# Patient Record
Sex: MALE | Race: WHITE | NOT HISPANIC OR LATINO | Employment: OTHER | URBAN - METROPOLITAN AREA
[De-identification: names, ages, dates, MRNs, and addresses within clinical notes are randomized per-mention and may not be internally consistent; named-entity substitution may affect disease eponyms.]

---

## 2024-04-19 NOTE — PROGRESS NOTES
"Subjective   Patient ID: Jack VELEZ Nejbauer \"Laury" is a 44 y.o. male who presents for hernia.  HPI  New patient to me here for evaluation of suspected umbilical hernia.  Patient had a CT scan in May 2024.  It revealed an enlarged liver and positive gallstones.  On my review of the CT although not mentioned in the report patient has an incarcerated umbilical hernia.  There also may be possible weaknesses in the inguinal sites.  Patient did have an EGD colonoscopy in February 2023 he does have a known history of Canela's and colon polyps.  Patient first noticed something after significant coughing approximately 6 months ago.  He says the bulging is like a football in his upper abdomen.  He denies any symptoms in his groin.  He denies any known family history of hernias.  His weight is slightly increased.    Review of Systems   Constitutional:  Negative for appetite change, fever and unexpected weight change.   HENT:  Negative for congestion and trouble swallowing.    Respiratory:  Negative for cough and shortness of breath.    Cardiovascular:  Negative for chest pain and palpitations.   Gastrointestinal:  Negative for abdominal pain, diarrhea and nausea.   Genitourinary:  Negative for difficulty urinating and dysuria.   Musculoskeletal:  Negative for back pain and gait problem.   Skin:  Negative for color change and rash.   Neurological:  Negative for dizziness, speech difficulty and headaches.   Hematological:  Does not bruise/bleed easily.   Psychiatric/Behavioral:  Negative for confusion.      Past Surgical History:   Procedure Laterality Date    MULTIPLE TOOTH EXTRACTIONS Bilateral 01/2023    all teeth removed-implants placed     Social History:  Tobacco Use - former quit 18 months ago  Do you use any recreational drugs - no  Alcohol Use - daily 4 drinks  Caffeine Intake - yes  Working - yes  Marital status -   Living with - spouse      Objective   Physical Exam  BP (!) 162/111 (BP Location: Left arm, " "Patient Position: Sitting)   Pulse 70   Ht 1.778 m (5' 10\")   Wt 121 kg (266 lb)   SpO2 97%   BMI 38.17 kg/m²    GENERAL APPEARANCE: Patient appears in no acute distress.   EYES: Sclera non-icteric, PERRLA.   ENT Normal appearance of ears and nose.   NECK/THYROID: Neck: no masses. Thyroid: no masses.   LYMPH NODES: No cervical or supraclavicular lymphadenopathy.   CARDIOVASCULAR Heart: RRR, no murmurs; Carotid bruits: none; Peripheral edema: none.   RESPIRATORY: Lungs: clear to auscultation bilaterally; no respiratory distress.   GI (ABDOMEN) No intraabdominal mass appreciated, no hepatosplenomegaly; Hernia: Patient with a large diastases recti in the epigastric area.  Patient also with a small incarcerated umbilical hernia with tenderness upon attempted reduction.  Unable to reduce completely.  No evidence of inguinal hernias on physical exam.  No tenderness in the inguinal region.     PSYCH: Patient oriented to time, place and person, normal affect.    Assessment/Plan   Problem List Items Addressed This Visit             ICD-10-CM    Umbilical hernia with obstruction but no gangrene K42.0     Patient with an incarcerated umbilical hernia.  Tenderness upon attempted reduction.  Palpates to be fatty tissue within the hernia.  Recommend open repair with mesh.  Risk benefits alternatives of doing the procedure were thoroughly discussed with the patient agrees to proceed.  Patient is going to discuss with his family to determine when to schedule.         Diastasis recti - Primary M62.08     Patient's main complaint is concerning the bulging he has in the epigastric area.  There is no hernia here it is a diastases recti.  Patient was reassured no treatment needed for this phenomenon.                 Coty Arroyo MD 04/25/24 9:36 AM   "

## 2024-04-24 ASSESSMENT — ENCOUNTER SYMPTOMS
CONFUSION: 0
FEVER: 0
HEADACHES: 0
NAUSEA: 0
DIZZINESS: 0
PALPITATIONS: 0
UNEXPECTED WEIGHT CHANGE: 0
BRUISES/BLEEDS EASILY: 0
SPEECH DIFFICULTY: 0
APPETITE CHANGE: 0
DIFFICULTY URINATING: 0
ABDOMINAL PAIN: 0
COLOR CHANGE: 0
DYSURIA: 0
COUGH: 0
SHORTNESS OF BREATH: 0
DIARRHEA: 0
TROUBLE SWALLOWING: 0
BACK PAIN: 0

## 2024-04-25 ENCOUNTER — OFFICE VISIT (OUTPATIENT)
Dept: SURGERY | Facility: CLINIC | Age: 44
End: 2024-04-25
Payer: COMMERCIAL

## 2024-04-25 ENCOUNTER — HOSPITAL ENCOUNTER (OUTPATIENT)
Facility: HOSPITAL | Age: 44
Setting detail: OUTPATIENT SURGERY
End: 2024-04-25
Attending: SURGERY | Admitting: SURGERY
Payer: COMMERCIAL

## 2024-04-25 VITALS
HEIGHT: 70 IN | HEART RATE: 70 BPM | SYSTOLIC BLOOD PRESSURE: 162 MMHG | OXYGEN SATURATION: 97 % | BODY MASS INDEX: 38.08 KG/M2 | DIASTOLIC BLOOD PRESSURE: 111 MMHG | WEIGHT: 266 LBS

## 2024-04-25 DIAGNOSIS — K42.0 UMBILICAL HERNIA WITH OBSTRUCTION BUT NO GANGRENE: ICD-10-CM

## 2024-04-25 DIAGNOSIS — M62.08 DIASTASIS RECTI: Primary | ICD-10-CM

## 2024-04-25 PROCEDURE — 99214 OFFICE O/P EST MOD 30 MIN: CPT | Performed by: SURGERY

## 2024-04-25 PROCEDURE — 99204 OFFICE O/P NEW MOD 45 MIN: CPT | Performed by: SURGERY

## 2024-04-25 PROCEDURE — 1036F TOBACCO NON-USER: CPT | Performed by: SURGERY

## 2024-04-25 RX ORDER — OMEPRAZOLE 40 MG/1
40 CAPSULE, DELAYED RELEASE ORAL
COMMUNITY

## 2024-04-25 RX ORDER — OMEPRAZOLE 20 MG/1
40 TABLET, DELAYED RELEASE ORAL
COMMUNITY
End: 2024-04-25 | Stop reason: DRUGHIGH

## 2024-04-25 RX ORDER — VERAPAMIL HYDROCHLORIDE 120 MG/1
120 TABLET, FILM COATED ORAL 2 TIMES DAILY
COMMUNITY

## 2024-04-25 ASSESSMENT — PAIN SCALES - GENERAL: PAINLEVEL: 0-NO PAIN

## 2024-04-25 ASSESSMENT — PATIENT HEALTH QUESTIONNAIRE - PHQ9
2. FEELING DOWN, DEPRESSED OR HOPELESS: NOT AT ALL
1. LITTLE INTEREST OR PLEASURE IN DOING THINGS: NOT AT ALL
SUM OF ALL RESPONSES TO PHQ9 QUESTIONS 1 AND 2: 0

## 2024-04-25 NOTE — ASSESSMENT & PLAN NOTE
Patient with an incarcerated umbilical hernia.  Tenderness upon attempted reduction.  Palpates to be fatty tissue within the hernia.  Recommend open repair with mesh.  Risk benefits alternatives of doing the procedure were thoroughly discussed with the patient agrees to proceed.  Patient is going to discuss with his family to determine when to schedule.

## 2024-04-25 NOTE — ASSESSMENT & PLAN NOTE
Patient's main complaint is concerning the bulging he has in the epigastric area.  There is no hernia here it is a diastases recti.  Patient was reassured no treatment needed for this phenomenon.

## 2024-05-09 ENCOUNTER — TELEPHONE (OUTPATIENT)
Dept: SURGERY | Facility: CLINIC | Age: 44
End: 2024-05-09
Payer: COMMERCIAL

## 2024-05-09 NOTE — TELEPHONE ENCOUNTER
ANABEL FROM [facility] CALLED OFFICE TO VERIFY PA FOR SURGERY ON 5/24/24 PER PT INS[employer]ANCE [employer].  PER NEIL WAITE AT INS[employer] AUTH #[id number] FROM DATES 5/24/24- 7/24/24.    LEFT  FOR ANABEL WAITE. [phone]

## 2024-05-16 ENCOUNTER — APPOINTMENT (OUTPATIENT)
Dept: PREADMISSION TESTING | Facility: HOSPITAL | Age: 44
End: 2024-05-16
Payer: COMMERCIAL

## 2024-06-03 NOTE — PROGRESS NOTES
"Subjective   Patient ID: Jack VELEZ Nejbauer \"Ángel\" is a 44 y.o. male who presents for No chief complaint on file..  HPI    Review of Systems    Objective   Physical Exam    Assessment/Plan   {Assess/PlanSmartLinks:84238}         Coty Arroyo MD 06/03/24 10:18 AM   "

## 2024-06-11 ENCOUNTER — APPOINTMENT (OUTPATIENT)
Dept: SURGERY | Facility: CLINIC | Age: 44
End: 2024-06-11
Payer: COMMERCIAL

## 2024-08-27 ENCOUNTER — APPOINTMENT (OUTPATIENT)
Dept: PRIMARY CARE | Facility: CLINIC | Age: 44
End: 2024-08-27
Payer: COMMERCIAL

## 2024-11-18 ENCOUNTER — OFFICE VISIT (OUTPATIENT)
Dept: GASTROENTEROLOGY | Facility: CLINIC | Age: 44
End: 2024-11-18
Payer: COMMERCIAL

## 2024-11-18 ENCOUNTER — OFFICE VISIT (OUTPATIENT)
Dept: PRIMARY CARE | Facility: CLINIC | Age: 44
End: 2024-11-18
Payer: COMMERCIAL

## 2024-11-18 VITALS
TEMPERATURE: 97.8 F | DIASTOLIC BLOOD PRESSURE: 80 MMHG | HEIGHT: 69 IN | BODY MASS INDEX: 39.25 KG/M2 | HEART RATE: 60 BPM | SYSTOLIC BLOOD PRESSURE: 156 MMHG | WEIGHT: 265 LBS

## 2024-11-18 VITALS
HEART RATE: 87 BPM | RESPIRATION RATE: 20 BRPM | WEIGHT: 265.2 LBS | OXYGEN SATURATION: 99 % | BODY MASS INDEX: 37.97 KG/M2 | SYSTOLIC BLOOD PRESSURE: 128 MMHG | HEIGHT: 70 IN | TEMPERATURE: 97.8 F | DIASTOLIC BLOOD PRESSURE: 82 MMHG

## 2024-11-18 DIAGNOSIS — R13.19 ESOPHAGEAL DYSPHAGIA: ICD-10-CM

## 2024-11-18 DIAGNOSIS — K21.9 GASTROESOPHAGEAL REFLUX DISEASE, UNSPECIFIED WHETHER ESOPHAGITIS PRESENT: ICD-10-CM

## 2024-11-18 DIAGNOSIS — K92.1 HEMATOCHEZIA: Primary | ICD-10-CM

## 2024-11-18 DIAGNOSIS — K22.70 BARRETT'S ESOPHAGUS WITHOUT DYSPLASIA: ICD-10-CM

## 2024-11-18 DIAGNOSIS — K52.9 CHRONIC DIARRHEA: ICD-10-CM

## 2024-11-18 DIAGNOSIS — K59.09 CHRONIC CONSTIPATION: ICD-10-CM

## 2024-11-18 DIAGNOSIS — Z12.5 PROSTATE CANCER SCREENING: ICD-10-CM

## 2024-11-18 DIAGNOSIS — G44.001 INTRACTABLE CLUSTER HEADACHE SYNDROME, UNSPECIFIED CHRONICITY PATTERN: Primary | ICD-10-CM

## 2024-11-18 DIAGNOSIS — F10.90 HEAVY ALCOHOL USE: ICD-10-CM

## 2024-11-18 DIAGNOSIS — Z00.00 ROUTINE GENERAL MEDICAL EXAMINATION AT A HEALTH CARE FACILITY: ICD-10-CM

## 2024-11-18 LAB
ALBUMIN SERPL BCP-MCNC: 4.2 G/DL (ref 3.4–5)
ALP SERPL-CCNC: 57 U/L (ref 33–120)
ALT SERPL W P-5'-P-CCNC: 45 U/L (ref 10–52)
ANION GAP SERPL CALCULATED.3IONS-SCNC: 12 MMOL/L (ref 10–20)
AST SERPL W P-5'-P-CCNC: 30 U/L (ref 9–39)
BASOPHILS # BLD AUTO: 0.07 X10*3/UL (ref 0–0.1)
BASOPHILS NFR BLD AUTO: 1.1 %
BILIRUB SERPL-MCNC: 0.6 MG/DL (ref 0–1.2)
BUN SERPL-MCNC: 17 MG/DL (ref 6–23)
CALCIUM SERPL-MCNC: 9.6 MG/DL (ref 8.6–10.3)
CHLORIDE SERPL-SCNC: 102 MMOL/L (ref 98–107)
CHOLEST SERPL-MCNC: 229 MG/DL (ref 0–199)
CHOLEST/HDLC SERPL: 4.8 {RATIO}
CO2 SERPL-SCNC: 27 MMOL/L (ref 21–32)
CREAT SERPL-MCNC: 0.92 MG/DL (ref 0.5–1.3)
EGFRCR SERPLBLD CKD-EPI 2021: >90 ML/MIN/1.73M*2
EOSINOPHIL # BLD AUTO: 0.35 X10*3/UL (ref 0–0.7)
EOSINOPHIL NFR BLD AUTO: 5.5 %
ERYTHROCYTE [DISTWIDTH] IN BLOOD BY AUTOMATED COUNT: 13.7 % (ref 11.5–14.5)
GLUCOSE SERPL-MCNC: 92 MG/DL (ref 74–99)
HCT VFR BLD AUTO: 45.2 % (ref 41–52)
HDLC SERPL-MCNC: 48.2 MG/DL
HGB BLD-MCNC: 15 G/DL (ref 13.5–17.5)
IMM GRANULOCYTES # BLD AUTO: 0.03 X10*3/UL (ref 0–0.7)
IMM GRANULOCYTES NFR BLD AUTO: 0.5 % (ref 0–0.9)
LDLC SERPL CALC-MCNC: 130 MG/DL
LYMPHOCYTES # BLD AUTO: 2.38 X10*3/UL (ref 1.2–4.8)
LYMPHOCYTES NFR BLD AUTO: 37.6 %
MCH RBC QN AUTO: 31.8 PG (ref 26–34)
MCHC RBC AUTO-ENTMCNC: 33.2 G/DL (ref 32–36)
MCV RBC AUTO: 96 FL (ref 80–100)
MONOCYTES # BLD AUTO: 0.65 X10*3/UL (ref 0.1–1)
MONOCYTES NFR BLD AUTO: 10.3 %
NEUTROPHILS # BLD AUTO: 2.85 X10*3/UL (ref 1.2–7.7)
NEUTROPHILS NFR BLD AUTO: 45 %
NON HDL CHOLESTEROL: 181 MG/DL (ref 0–149)
NRBC BLD-RTO: 0 /100 WBCS (ref 0–0)
PLATELET # BLD AUTO: 298 X10*3/UL (ref 150–450)
POTASSIUM SERPL-SCNC: 4.3 MMOL/L (ref 3.5–5.3)
PROT SERPL-MCNC: 7.3 G/DL (ref 6.4–8.2)
PSA SERPL-MCNC: 0.66 NG/ML
RBC # BLD AUTO: 4.72 X10*6/UL (ref 4.5–5.9)
SODIUM SERPL-SCNC: 137 MMOL/L (ref 136–145)
T4 FREE SERPL-MCNC: 0.72 NG/DL (ref 0.61–1.12)
TRIGL SERPL-MCNC: 254 MG/DL (ref 0–149)
TSH SERPL-ACNC: 4.91 MIU/L (ref 0.44–3.98)
VLDL: 51 MG/DL (ref 0–40)
WBC # BLD AUTO: 6.3 X10*3/UL (ref 4.4–11.3)

## 2024-11-18 PROCEDURE — 3008F BODY MASS INDEX DOCD: CPT | Performed by: NURSE PRACTITIONER

## 2024-11-18 PROCEDURE — 80053 COMPREHEN METABOLIC PANEL: CPT | Performed by: NURSE PRACTITIONER

## 2024-11-18 PROCEDURE — 85025 COMPLETE CBC W/AUTO DIFF WBC: CPT | Performed by: NURSE PRACTITIONER

## 2024-11-18 PROCEDURE — 84443 ASSAY THYROID STIM HORMONE: CPT | Performed by: NURSE PRACTITIONER

## 2024-11-18 PROCEDURE — 93000 ELECTROCARDIOGRAM COMPLETE: CPT | Performed by: NURSE PRACTITIONER

## 2024-11-18 PROCEDURE — 99214 OFFICE O/P EST MOD 30 MIN: CPT | Performed by: NURSE PRACTITIONER

## 2024-11-18 PROCEDURE — 84439 ASSAY OF FREE THYROXINE: CPT | Performed by: NURSE PRACTITIONER

## 2024-11-18 PROCEDURE — 99204 OFFICE O/P NEW MOD 45 MIN: CPT | Performed by: NURSE PRACTITIONER

## 2024-11-18 PROCEDURE — 1036F TOBACCO NON-USER: CPT | Performed by: NURSE PRACTITIONER

## 2024-11-18 PROCEDURE — 99396 PREV VISIT EST AGE 40-64: CPT | Performed by: NURSE PRACTITIONER

## 2024-11-18 PROCEDURE — 84153 ASSAY OF PSA TOTAL: CPT | Performed by: NURSE PRACTITIONER

## 2024-11-18 PROCEDURE — 80061 LIPID PANEL: CPT | Performed by: NURSE PRACTITIONER

## 2024-11-18 RX ORDER — POLYETHYLENE GLYCOL 3350, SODIUM SULFATE ANHYDROUS, SODIUM BICARBONATE, SODIUM CHLORIDE, POTASSIUM CHLORIDE 236; 22.74; 6.74; 5.86; 2.97 G/4L; G/4L; G/4L; G/4L; G/4L
4 POWDER, FOR SOLUTION ORAL ONCE
Qty: 4000 ML | Refills: 0 | Status: SHIPPED | OUTPATIENT
Start: 2024-11-18 | End: 2024-11-18

## 2024-11-18 RX ORDER — VERAPAMIL HCL 240 MG
240 TABLET, EXTENDED RELEASE ORAL NIGHTLY
COMMUNITY
Start: 2024-11-02

## 2024-11-18 RX ORDER — SUMATRIPTAN SUCCINATE 100 MG/1
100 TABLET ORAL ONCE AS NEEDED
COMMUNITY
Start: 2024-10-29 | End: 2024-11-18 | Stop reason: SDUPTHER

## 2024-11-18 RX ORDER — SUMATRIPTAN 6 MG/.5ML
6 INJECTION, SOLUTION SUBCUTANEOUS AS NEEDED
COMMUNITY
Start: 2024-11-05

## 2024-11-18 RX ORDER — GALCANEZUMAB 100 MG/ML
INJECTION, SOLUTION SUBCUTANEOUS
COMMUNITY

## 2024-11-18 RX ORDER — SUMATRIPTAN SUCCINATE 100 MG/1
100 TABLET ORAL ONCE AS NEEDED
Qty: 9 TABLET | Refills: 11 | Status: SHIPPED | OUTPATIENT
Start: 2024-11-18

## 2024-11-18 ASSESSMENT — ENCOUNTER SYMPTOMS
RESPIRATORY NEGATIVE: 1
MYALGIAS: 0
EYE PAIN: 0
ADENOPATHY: 0
HALLUCINATIONS: 0
SHORTNESS OF BREATH: 0
AGITATION: 0
CARDIOVASCULAR NEGATIVE: 1
BACK PAIN: 0
GASTROINTESTINAL NEGATIVE: 1
DIAPHORESIS: 0
NUMBNESS: 0
PALPITATIONS: 0
DEPRESSION: 0
DYSURIA: 0
CONSTITUTIONAL NEGATIVE: 1
FATIGUE: 0
CHILLS: 0
SORE THROAT: 0
FEVER: 0
OCCASIONAL FEELINGS OF UNSTEADINESS: 0
NERVOUS/ANXIOUS: 0
LOSS OF SENSATION IN FEET: 0
WEAKNESS: 0
COUGH: 0
MUSCULOSKELETAL NEGATIVE: 1
FREQUENCY: 0
ARTHRALGIAS: 0
JOINT SWELLING: 0
PHOTOPHOBIA: 0
FLANK PAIN: 0
HEMATURIA: 0
WHEEZING: 0
DIZZINESS: 0
HEADACHES: 1
LIGHT-HEADEDNESS: 0

## 2024-11-18 ASSESSMENT — PATIENT HEALTH QUESTIONNAIRE - PHQ9
SUM OF ALL RESPONSES TO PHQ9 QUESTIONS 1 AND 2: 0
1. LITTLE INTEREST OR PLEASURE IN DOING THINGS: NOT AT ALL
2. FEELING DOWN, DEPRESSED OR HOPELESS: NOT AT ALL

## 2024-11-18 ASSESSMENT — PAIN SCALES - GENERAL: PAINLEVEL_OUTOF10: 0-NO PAIN

## 2024-11-18 NOTE — PATIENT INSTRUCTIONS
Thanks for coming to the GI clinic.     I would like you to get an EGD.     I would like you to get a colonoscopy.   Please read all of the instructions 7 days before your colonoscopy.   You will need to take ONLY clear liquids the ENTIRE day before your procedure. These include (clear fruit juices, soda, Gatorade, broth, jello and coffee/tea) Avoid red and purple drinks. No cream or milk in the coffee.   You will need to take a bowel preparation.   You will also need a .      Please call [phone] to schedule.     Follow up will be based upon the above.

## 2024-11-18 NOTE — PROGRESS NOTES
"Subjective   Patient ID: Jack VELEZ Nejbauer \"Ángel\" is a 44 y.o. male who presents for rectal bleeding.     This is a 44 year old WM with history of nicotine/heavy alcohol use, obesity, SERGEY on CPAP, HTN, DLD, cluster headaches, GERD, and non dysplastic Canela's esophagus who is presenting to the GI clinic for an initial visit.      History per pt, wife, and review of EMR    Of note, pt had a cluster headache in clinic for which we had to delay the visit for approximately 5 minutes.     Reports for the past 5 years he's been having painless rectal bleeding. As of most recent, though, the bleeding is more frequent and is darker in color (wine colored). He's passing clots per rectum. Sometimes he passes blood with bowel movements, but sometimes he passes blood without any stool.     ROS positive for episodic esophageal dysphagia for the past month. Feels food stuck in his throat after swallowing. No issues with chewing or initiating a swallow.     ROS positive for many years' worth of constipation and diarrhea. 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 Sometimes he has diarrhea which looks like \"muddy watery\". Averages 1-2 stools per day. Denies nocturnal diarrhea.     GERD controlled with omeprazole 40 mg/day. Has heartburn if he does not take omeprazole.     Denies unintentional weight loss, odynophagia, abdominal pain, oily stools, hematemesis, and melena.     Lab Results   Component Value Date    WBC 6.3 11/18/2024    HGB 15.0 11/18/2024    HCT 45.2 11/18/2024    MCV 96 11/18/2024     11/18/2024          Past medical history:   See above     Past surgical history:   Vasectomy   Neil procedure for headaches   Dental implants     Family history:  No GI cancers or IBD    Social history:   Vapes nicotine  Quit smoking cigarettes; smoked 3 PPD for 20-25 years  Drinks 6-8 beers per day; drinks 4 shots of liquor daily   Denies use of illicit drugs   Owns a [employer]       EGD/colonoscopy 10/2021 " Dr. Macdonald [facility]:   Canela's esophagus negative for dysplasia and nine adenomatous colon polyps removed including a 1 cm sigmoid tubulovillous adenomatous polyp.       EGD/colonoscopy 2/2023 Dr. Macdonald [facility]:   1. Canela's esophagus, erosive esophagitis, gastropathy, focal area of duodenopathy just distal to the papilla   2. Colon polyps, internal hemorrhoids     A.  Duodenum, papilla, biopsy:       - Duodenal mucosa showing changes of chronic  peptic duodenitis.   B.  Stomach, prepyloric antral fold, biopsy:   - Antral type mucosa showing minimal chronic inflammation (antritis).   C.  Stomach, biopsy:   - Oxyntic/antral mucosa showing mild chronic inactive gastritis;     -   Microcystic changes of oxyntic glands, suggestive of proton pump inhibitor (PPI)   effect.    Comment: The mucosal fragments show no changes suggestive of H.   pylori gastritis.  D.  Esophagus, 37-36 cm, biopsy:       - Squamocolumnar/columnar mucosa showing intestinal metaplasia (Canela's type mucosa);   - Negative for epithelial dysplasia.  E.  Esophagus, 36-34 cm, biopsy:   - Mild reflux esophagitis;       - Squamocolumnar/columnar mucosa showing intestinal metaplasia (Canela's type mucosa);   - Negative for epithelial dysplasia.   F.  Esophagus, at 33 cm, biopsy:       - Mild reflux esophagitis;       - Squamocolumnar/columnar mucosa showing intestinal metaplasia (Canela's type   mucosa);   - Negative for epithelial dysplasia.   G.  Ascending colon, polyps, polypectomy:       - Tubular adenoma (1) and fragments of mucosal folds.   H.  Transverse colon, polyps, polypectomy:   - Incipient tubular adenoma (1) and fragments of mucosal folds.   I.  Colon, splenic flexure polyp, polypectomy:   - Fragments of sessile serrated polyp.   J.  Rectum, polyp, polypectomy:   - Fragments of hyperplastic polyp.       Review of Systems   Constitutional:  Negative for chills, diaphoresis, fatigue and fever.   HENT:  Negative for congestion, ear pain,  "hearing loss, sneezing and sore throat.    Eyes:  Negative for photophobia, pain and visual disturbance.   Respiratory:  Negative for cough, shortness of breath and wheezing.    Cardiovascular:  Negative for chest pain, palpitations and leg swelling.   Endocrine: Negative for cold intolerance and heat intolerance.   Genitourinary:  Negative for dysuria, flank pain, frequency and hematuria.   Musculoskeletal:  Negative for arthralgias, back pain, gait problem, joint swelling and myalgias.   Skin:  Negative for rash.   Neurological:  Negative for dizziness, syncope, weakness, light-headedness and numbness.   Hematological:  Negative for adenopathy.   Psychiatric/Behavioral:  Negative for agitation and hallucinations. The patient is not nervous/anxious.        No Known Allergies    Current Outpatient Medications   Medication Sig Dispense Refill    galcanezumab (Emgality Syringe) 300 mg/3 mL (100 mg/mL x 3) prefilled syringe Inject under the skin.      omeprazole (PriLOSEC) 40 mg DR capsule Take 1 capsule (40 mg) by mouth once daily in the morning. Take before meals. Do not crush or chew.      SUMAtriptan (Imitrex) 100 mg tablet Take 1 tablet (100 mg) by mouth 1 time if needed for migraine. 9 tablet 11    SUMAtriptan (Imitrex) 6 mg/0.5 mL 0.5 mL (6 mg) if needed.      verapamil (Calan) 120 mg tablet Take 1 tablet (120 mg) by mouth 2 times a day.      verapamil SR (Calan-SR) 240 mg ER tablet Take 1 tablet (240 mg) by mouth once daily at bedtime.       No current facility-administered medications for this visit.        Objective     /80   Pulse 60   Temp 36.6 °C (97.8 °F)   Ht 1.753 m (5' 9\")   Wt 120 kg (265 lb)   BMI 39.13 kg/m²     Physical Exam  Constitutional:       General: He is not in acute distress.     Appearance: He is obese.   HENT:      Head: Normocephalic and atraumatic.   Eyes:      Conjunctiva/sclera: Conjunctivae normal.   Cardiovascular:      Rate and Rhythm: Normal rate and regular rhythm.    "   Heart sounds: No murmur heard.     No gallop.   Pulmonary:      Effort: Pulmonary effort is normal.      Breath sounds: Normal breath sounds.   Abdominal:      General: Bowel sounds are normal. There is no distension.      Tenderness: There is no abdominal tenderness. There is no guarding.   Musculoskeletal:         General: No swelling or deformity. Normal range of motion.      Cervical back: Normal range of motion. No rigidity.   Skin:     General: Skin is warm and dry.      Coloration: Skin is not jaundiced.      Findings: No lesion or rash.   Neurological:      General: No focal deficit present.      Mental Status: He is alert and oriented to person, place, and time.   Psychiatric:         Mood and Affect: Mood normal.         Assessment/Plan   Problem List Items Addressed This Visit    None  Visit Diagnoses       Hematochezia    -  Primary    Relevant Orders    Colonoscopy Diagnostic    Esophageal dysphagia        Relevant Orders    Esophagogastroduodenoscopy (EGD)    Gastroesophageal reflux disease, unspecified whether esophagitis present        Canela's esophagus without dysplasia        Chronic constipation        Chronic diarrhea        Heavy alcohol use               Painless hematochezia: given the characteristic of bleeding coupled with history of adenomatous colon polyps, need to consider polyps and colorectal cancer. Bleeding could be hemorrhoid related as well. Reassuringly, there is no anemia.   - will proceed with colonoscopy   - Golytely split bowel prep     2. Esophageal dysphagia: consider esophagitis, peptic stricture, and esophageal malignancy in the setting of Canela's esophagus.   - will proceed with EGD    3. Nondysplastic Canela's esophagus, GERD:  - EGD as above  - recommend indefinite PPI use     4. Chronic constipation and diarrhea: history regarding this is vague.   - consider further evaluation and management pending results of colonoscopy     5. Heavy alcohol use:  - discuss with  pt the importance of cutting back on alcohol consumption to avoid potential complications such as liver cirrhosis     6. Follow up:  - pt plans to follow up based upon the above

## 2024-11-18 NOTE — PROGRESS NOTES
"Subjective   Patient ID: Ángel Medina is a 44 y.o. male who presents for Annual Exam (Need a referral to cardiology for the verapamil he takes. He needs an EKG as well. ).  Having READ procedure for cluster headahes, increased verapamil from neurology uses supplemental oxygen therapy   HPI     Review of Systems   Constitutional: Negative.    HENT: Negative.     Respiratory: Negative.     Cardiovascular: Negative.    Gastrointestinal: Negative.    Genitourinary: Negative.    Musculoskeletal: Negative.    Neurological:  Positive for headaches.       Objective   /82   Pulse 87   Temp 36.6 °C (97.8 °F)   Resp 20   Ht 1.778 m (5' 10\")   Wt 120 kg (265 lb 3.2 oz)   SpO2 99%   BMI 38.05 kg/m²     Physical Exam  Vitals and nursing note reviewed.   Constitutional:       General: He is not in acute distress.  HENT:      Right Ear: Tympanic membrane and ear canal normal.      Left Ear: Tympanic membrane and ear canal normal.      Nose: Nose normal. No rhinorrhea.      Mouth/Throat:      Pharynx: Oropharynx is clear. No oropharyngeal exudate or posterior oropharyngeal erythema.      Comments: Dentition wnl  Eyes:      Extraocular Movements: Extraocular movements intact.      Conjunctiva/sclera: Conjunctivae normal.      Pupils: Pupils are equal, round, and reactive to light.   Neck:      Vascular: No carotid bruit.   Cardiovascular:      Rate and Rhythm: Normal rate and regular rhythm.      Heart sounds: Normal heart sounds. No murmur heard.  Pulmonary:      Breath sounds: Normal breath sounds. No wheezing or rhonchi.   Abdominal:      General: Bowel sounds are normal. There is no distension.      Palpations: Abdomen is soft. There is no mass.      Tenderness: There is no abdominal tenderness. There is no guarding or rebound.      Hernia: No hernia is present.   Musculoskeletal:         General: No swelling or tenderness. Normal range of motion.      Cervical back: Normal range of motion and neck supple. "   Lymphadenopathy:      Cervical: No cervical adenopathy.   Skin:     General: Skin is warm.      Findings: No rash.   Neurological:      General: No focal deficit present.      Mental Status: He is alert.         Assessment/Plan

## 2024-11-27 DIAGNOSIS — E78.2 MIXED HYPERLIPIDEMIA: Primary | ICD-10-CM

## 2024-11-27 RX ORDER — FENOFIBRATE 48 MG/1
48 TABLET, FILM COATED ORAL DAILY
Qty: 30 TABLET | Refills: 5 | Status: SHIPPED | OUTPATIENT
Start: 2024-11-27 | End: 2025-05-26

## 2024-11-29 DIAGNOSIS — E55.9 VITAMIN D DEFICIENCY: ICD-10-CM

## 2024-11-29 DIAGNOSIS — I10 PRIMARY HYPERTENSION: ICD-10-CM

## 2024-11-29 DIAGNOSIS — Z00.00 ROUTINE MEDICAL EXAM: ICD-10-CM

## 2024-11-29 DIAGNOSIS — R79.89 ABNORMAL TSH: ICD-10-CM

## 2024-11-29 DIAGNOSIS — E78.5 HYPERLIPIDEMIA, UNSPECIFIED HYPERLIPIDEMIA TYPE: ICD-10-CM

## 2024-12-02 NOTE — PROGRESS NOTES
Primary Care Physician: Sherif Oconnell DO   Date of Visit: 12/09/2024  8:30 AM EST  Type of Visit: New      Chief Complaint:  No chief complaint on file.       HPI  Jack Medina 44 y.o. male with a PMH of atypical CP, Canela's esophagus, HTN, hypertriglyceridemia, SERGEY (CPAP), ETOH/TOB, FHx CAD presents today for cardiac evaluation. Echo 1/2019 showed LVEF 55-60%. EKG in the past has been normal. Recent CBC, CMP unremarkable. Lipids 11/2024 total 229, HDL 48, , triglycerides 254. He is following with GI and has EGD and colonoscopy pending. Cardiac medications include fenofibrate 48 mg daily and verapamil 240 mg nightly.       Review of Systems   Review of Systems   12 points review of systems are negative expect for the above    Social History:  Social History     Socioeconomic History    Marital status:      Spouse name: Not on file    Number of children: Not on file    Years of education: Not on file    Highest education level: Not on file   Occupational History    Not on file   Tobacco Use    Smoking status: Former     Current packs/day: 0.00     Types: Cigarettes     Passive exposure: Past    Smokeless tobacco: Never   Vaping Use    Vaping status: Every Day    Substances: Nicotine, Flavoring    Devices: Disposable   Substance and Sexual Activity    Alcohol use: Yes     Comment: 4 drinks a day    Drug use: Never    Sexual activity: Yes     Partners: Female   Other Topics Concern    Not on file   Social History Narrative    Not on file     Social Drivers of Health     Financial Resource Strain: Low Risk  (9/12/2022)    Received from [facility]    Overall Financial Resource Strain (CARDIA)     Difficulty of Paying Living Expenses: Not hard at all   Food Insecurity: No Food Insecurity (9/12/2022)    Received from [facility]    Hunger Vital Sign     Worried About Running Out of Food in the Last Year: Never true     Ran Out of Food in the Last Year: Never true   Transportation Needs: No  Transportation Needs (9/12/2022)    Received from [facility]    PRAPARE - Transportation     Lack of Transportation (Medical): No     Lack of Transportation (Non-Medical): No   Physical Activity: Insufficiently Active (9/12/2022)    Received from [facility]    Exercise Vital Sign     Days of Exercise per Week: 1 day     Minutes of Exercise per Session: 10 min   Stress: Stress Concern Present (9/12/2022)    Received from [facility]    [employer] - Occupational Stress Questionnaire     Feeling of Stress : To some extent   Social Connections: Moderately Isolated (9/12/2022)    Received from [facility]    Social Connection and Isolation Panel [NHANES]     Frequency of Communication with Friends and Family: More than three times a week     Frequency of Social Gatherings with Friends and Family: Once a week     Attends Orthodox Services: Never     Active Member of Clubs or Organizations: No     Attends Club or Organization Meetings: Not on file     Marital Status: Living with partner   Intimate Partner Violence: Not At Risk (9/12/2022)    Received from [facility]    Humiliation, Afraid, Rape, and Kick questionnaire     Fear of Current or Ex-Partner: No     Emotionally Abused: No     Physically Abused: No     Sexually Abused: No   Housing Stability: Low Risk  (9/12/2022)    Received from [facility]    Housing Stability Vital Sign     Unable to Pay for Housing in the Last Year: No     Number of Places Lived in the Last Year: 1     Unstable Housing in the Last Year: No        Past Medical History:  Past Medical History:   Diagnosis Date    Melena 01/11/2019    Blood in stool    Other specified health status     No pertinent past medical history    Personal history of other diseases of the digestive system 01/11/2019    History of hematemesis       Past Surgical History:  Past Surgical History:   Procedure Laterality Date    MULTIPLE TOOTH EXTRACTIONS Bilateral 01/2023    all teeth  "removed-implants placed       Family History:  Family History   Problem Relation Name Age of Onset    Heart disease Mother      Autoimmune disease Mother      Heart attack Father          Objective:   Physical Exam        4/29/2022    12:00 AM 5/27/2022    12:00 AM 6/1/2022    12:00 AM 7/14/2022    12:00 AM 4/25/2024     9:19 AM 11/18/2024     7:21 AM 11/18/2024     9:25 AM   Vitals   Systolic  131 156 138 162 128 156   Diastolic  90 100 80 111 82 80   BP Location     Left arm     Heart Rate  92 90 78 70 87 60   Temp  36.7 °C (98 °F) 37.3 °C (99.1 °F) 36.5 °C (97.7 °F)  36.6 °C (97.8 °F) 36.6 °C (97.8 °F)   Resp 18 18 18 19 20    Height 1.778 m (5' 10\") 1.778 m (5' 10\") 1.778 m (5' 10\") 1.778 m (5' 10\") 1.778 m (5' 10\") 1.778 m (5' 10\") 1.753 m (5' 9\")   Weight (lb)  258 256.6 249 266 265.2 265   BMI 36.16 kg/m2 37.02 kg/m2 36.82 kg/m2 35.73 kg/m2 38.17 kg/m2 38.05 kg/m2 39.13 kg/m2   BSA (m2) 2.37 m2 2.4 m2 2.39 m2 2.36 m2 2.44 m2 2.43 m2 2.42 m2   Visit Report     Report Report    Report Report    Report        Allergies:  No Known Allergies    Medications:  Current Outpatient Medications   Medication Instructions    fenofibrate (TRICOR) 48 mg, oral, Daily    galcanezumab (Emgality Syringe) 300 mg/3 mL (100 mg/mL x 3) prefilled syringe Inject under the skin.    omeprazole (PRILOSEC) 40 mg, Daily before breakfast    SUMAtriptan (IMITREX) 6 mg, As needed    SUMAtriptan (IMITREX) 100 mg, oral, Once as needed    verapamil (CALAN) 120 mg, 2 times daily    verapamil SR (CALAN-SR) 240 mg, Nightly        Labs and Imaging:     Lab Results   Component Value Date    WBC 6.3 11/18/2024    HGB 15.0 11/18/2024    HCT 45.2 11/18/2024     11/18/2024    CHOL 229 (H) 11/18/2024    TRIG 254 (H) 11/18/2024    HDL 48.2 11/18/2024    ALT 45 11/18/2024    AST 30 11/18/2024     11/18/2024    K 4.3 11/18/2024     11/18/2024    CREATININE 0.92 11/18/2024    BUN 17 11/18/2024    CO2 27 11/18/2024    TSH 4.91 (H) " 11/18/2024         Echocardiogram  [facility], [address] [address]               Tel [phone] and Fax [phone]     TRANSTHORACIC ECHOCARDIOGRAM REPORT        Patient Name:     PRESLEY MALDONADO Reading Physician:   [id number] Corbin Rojas MD  Study Date:       1/7/2019          Referring Physician: Jayde Mccord CNP  MRN/PID:          [id number]          PCP:                 Christen Cheuvront CNP  Accession/Order#: [id number]      Department Location: [facility]  YOB: 1980         Fellow:  Gender:           M                 Nurse:  Admit Date:                         Sonographer:         Rosemarie Simpson [facility]  Admission Status: Outpatient        Additional Staff:  Height:           177.80 cm         CC Report to:        Jayde Mccord CNP  Weight:           108.86 kg         Study Type:          Echocardiogram  BSA:              2.26 m2  Blood Pressure: 133 /89 mmHg     Diagnosis/ICD: R55-Syncope  Indication:    SOB / CP  Procedure/CPT: Echo Complete w Full [id number]     Patient History:  Smoker:            Current.  Pertinent History: Chest Pain, Dyspnea, Syncope and Palpitations. fatigue.     Study Detail: The following Echo studies were performed: 2D, M-Mode, Doppler and                color flow. Technically challenging study due to respiratory                interference. The patient was awake.        PHYSICIAN INTERPRETATION:  Left Ventricle: The left ventricular systolic function is normal, with an estimated ejection fraction of 55-60%. The left ventricular cavity size is normal. Spectral Doppler shows a normal pattern of left ventricular diastolic filling.  LV Wall Scoring:  All segments are normal.     Left Atrium: The left atrium is normal in size.  Right Ventricle: The right ventricle is normal in size. There is normal right ventricular global systolic  function.  Right Atrium: The right atrium is normal in size.  Aortic Valve: The aortic valve is trileaflet. There is no evidence of aortic valve regurgitation. The peak instantaneous gradient of the aortic valve is 6.2 mmHg.  Mitral Valve: The mitral valve is normal in structure. There is no evidence of mitral valve regurgitation.  Tricuspid Valve: The tricuspid valve is structurally normal. No evidence of tricuspid regurgitation. The right ventricular systolic pressure is unable to be estimated.  Pulmonic Valve: The pulmonic valve is structurally normal. There is no indication of pulmonic valve regurgitation.  Pericardium: There is no pericardial effusion noted.  Aorta: The aortic root is normal.        CONCLUSIONS:   1. The left ventricular systolic function is normal with a 55-60% estimated ejection fraction.       Stress Testing: No results found for this or any previous visit from the past 1825 days.    Cardiac Catheterization: No results found for this or any previous visit from the past 1825 days.    Cardiac Scoring: No results found for this or any previous visit from the past 1825 days.    AAA : No results found for this or any previous visit from the past 1825 days.    OTHER: No results found for this or any previous visit from the past 1825 days.      The 10-year ASCVD risk score (Adolfo DK, et al., 2019) is: 4.4%    Values used to calculate the score:      Age: 44 years      Sex: Male      Is Non- : No      Diabetic: No      Tobacco smoker: No      Systolic Blood Pressure: 156 mmHg      Is BP treated: Yes      HDL Cholesterol: 48.2 mg/dL      Total Cholesterol: 229 mg/dL         Assessment:   ***    No diagnosis found.     Plan:      ____________________________________________________________  Richar Perdomo CNP  Cardiovascular Medicine   [facility] [place]  [facility]    {Lab/Diag/Rad Review:[id number]}

## 2024-12-09 ENCOUNTER — APPOINTMENT (OUTPATIENT)
Dept: CARDIOLOGY | Facility: CLINIC | Age: 44
End: 2024-12-09
Payer: COMMERCIAL

## 2024-12-16 NOTE — PROGRESS NOTES
Primary Care Physician: Sherif Oconnell DO   Date of Visit: 12/23/2024  9:00 AM EST  Type of Visit: New      Chief Complaint:  Chief Complaint   Patient presents with    Referral     High dose of verapamil for cluster HA        HPI  Jack Medina 44 y.o. male with a PMH of atypical CP, SERGEY (on CPAP), Canela's esophagus, HTN, hypertriglyceridemia, SERGEY (CPAP), ETOH/TOB, FHx CAD, cluster headaches presents today for cardiac evaluation. CT CAC score 12/2018 was 0. Echo 1/2019 showed LVEF 55-60%. EKG 11/2024 showed SB HR 47 bpm. Recent CBC, CMP unremarkable. Lipids 11/2024 total 229, HDL 48, , triglycerides 254. Cardiac medications include fenofibrate 48 mg daily and verapamil 240 mg nightly. He is on verapamil for cluster headaches and increases dosing as needed based on symptoms. He takes anywhere from 120 mg daily to 360 mg TID. This was originally prescribed through neurologist at [facility].     Father MI with stents with CHF, mom MI stents.     Review of Systems   Review of Systems   Musculoskeletal:  Positive for back pain.   Neurological:  Positive for headaches.   Psychiatric/Behavioral:  Positive for sleep disturbance.    All other systems reviewed and are negative.     12 points review of systems are negative expect for the above    Social History:  Social History     Socioeconomic History    Marital status:      Spouse name: Not on file    Number of children: Not on file    Years of education: Not on file    Highest education level: Not on file   Occupational History    Not on file   Tobacco Use    Smoking status: Former     Current packs/day: 0.00     Types: Cigarettes     Passive exposure: Past    Smokeless tobacco: Never   Vaping Use    Vaping status: Every Day    Substances: Nicotine, Flavoring    Devices: Disposable   Substance and Sexual Activity    Alcohol use: Yes     Comment: 4 drinks a day    Drug use: Never    Sexual activity: Yes     Partners: Female   Other Topics Concern     Not on file   Social History Narrative    Not on file     Social Drivers of Health     Financial Resource Strain: Low Risk  (9/12/2022)    Received from [facility]    Overall Financial Resource Strain (CARDIA)     Difficulty of Paying Living Expenses: Not hard at all   Food Insecurity: No Food Insecurity (9/12/2022)    Received from [facility]    Hunger Vital Sign     Worried About Running Out of Food in the Last Year: Never true     Ran Out of Food in the Last Year: Never true   Transportation Needs: No Transportation Needs (9/12/2022)    Received from [facility]    PRAPARE - Transportation     Lack of Transportation (Medical): No     Lack of Transportation (Non-Medical): No   Physical Activity: Insufficiently Active (9/12/2022)    Received from [facility]    Exercise Vital Sign     Days of Exercise per Week: 1 day     Minutes of Exercise per Session: 10 min   Stress: Stress Concern Present (9/12/2022)    Received from [facility]    [employer] - Occupational Stress Questionnaire     Feeling of Stress : To some extent   Social Connections: Moderately Isolated (9/12/2022)    Received from [facility]    Social Connection and Isolation Panel [NHANES]     Frequency of Communication with Friends and Family: More than three times a week     Frequency of Social Gatherings with Friends and Family: Once a week     Attends Quaker Services: Never     Active Member of Clubs or Organizations: No     Attends Club or Organization Meetings: Not on file     Marital Status: Living with partner   Intimate Partner Violence: Not At Risk (9/12/2022)    Received from [facility]    Humiliation, Afraid, Rape, and Kick questionnaire     Fear of Current or Ex-Partner: No     Emotionally Abused: No     Physically Abused: No     Sexually Abused: No   Housing Stability: Low Risk  (9/12/2022)    Received from [facility]    Housing Stability Vital Sign     Unable to Pay for Housing in the Last Year: No      Number of Places Lived in the Last Year: 1     Unstable Housing in the Last Year: No        Past Medical History:  Past Medical History:   Diagnosis Date    Melena 01/11/2019    Blood in stool    Other specified health status     No pertinent past medical history    Personal history of other diseases of the digestive system 01/11/2019    History of hematemesis       Past Surgical History:  Past Surgical History:   Procedure Laterality Date    MULTIPLE TOOTH EXTRACTIONS Bilateral 01/2023    all teeth removed-implants placed       Family History:  Family History   Problem Relation Name Age of Onset    Heart disease Mother      Autoimmune disease Mother      Heart attack Father          Objective:   Physical Exam  Vitals reviewed.   Constitutional:       Appearance: Normal appearance. He is normal weight.   HENT:      Head: Normocephalic and atraumatic.   Neck:      Vascular: No carotid bruit or JVD.   Cardiovascular:      Rate and Rhythm: Normal rate and regular rhythm.      Pulses: Normal pulses.      Heart sounds: Normal heart sounds.   Pulmonary:      Effort: Pulmonary effort is normal.      Breath sounds: Normal breath sounds.   Chest:      Chest wall: No tenderness.   Abdominal:      General: Abdomen is flat. Bowel sounds are normal.      Palpations: Abdomen is soft.   Skin:     General: Skin is warm and dry.   Neurological:      General: No focal deficit present.      Mental Status: He is alert and oriented to person, place, and time. Mental status is at baseline.   Psychiatric:         Mood and Affect: Mood normal.         Behavior: Behavior normal.             5/27/2022    12:00 AM 6/1/2022    12:00 AM 7/14/2022    12:00 AM 4/25/2024     9:19 AM 11/18/2024     7:21 AM 11/18/2024     9:25 AM 12/23/2024     9:06 AM   Vitals   Systolic 131 156 138 162 128 156 152   Diastolic 90 100 80 111 82 80 93   BP Location    Left arm   Left arm   Heart Rate 92 90 78 70 87 60 97   Temp 36.7 °C (98 °F) 37.3 °C (99.1 °F)  "36.5 °C (97.7 °F)  36.6 °C (97.8 °F) 36.6 °C (97.8 °F)    Resp 18 18 19  20     Height 1.778 m (5' 10\") 1.778 m (5' 10\") 1.778 m (5' 10\") 1.778 m (5' 10\") 1.778 m (5' 10\") 1.753 m (5' 9\") 1.727 m (5' 8\")   Weight (lb) 258 256.6 249 266 265.2 265 265   BMI 37.02 kg/m2 36.82 kg/m2 35.73 kg/m2 38.17 kg/m2 38.05 kg/m2 39.13 kg/m2 40.29 kg/m2   BSA (m2) 2.4 m2 2.39 m2 2.36 m2 2.44 m2 2.43 m2 2.42 m2 2.4 m2   Visit Report    Report Report    Report Report    Report Report        Allergies:  No Known Allergies    Medications:  Current Outpatient Medications   Medication Instructions    fenofibrate (TRICOR) 48 mg, oral, Daily    galcanezumab (Emgality Syringe) 300 mg/3 mL (100 mg/mL x 3) prefilled syringe Inject under the skin.    omeprazole (PRILOSEC) 40 mg, Daily before breakfast    SUMAtriptan (IMITREX) 6 mg, As needed    SUMAtriptan (IMITREX) 100 mg, oral, Once as needed    verapamil (CALAN) 120 mg, 2 times daily    verapamil SR (CALAN-SR) 240 mg, Nightly        Labs and Imaging:     Lab Results   Component Value Date    WBC 6.3 11/18/2024    HGB 15.0 11/18/2024    HCT 45.2 11/18/2024     11/18/2024    CHOL 229 (H) 11/18/2024    TRIG 254 (H) 11/18/2024    HDL 48.2 11/18/2024    ALT 45 11/18/2024    AST 30 11/18/2024     11/18/2024    K 4.3 11/18/2024     11/18/2024    CREATININE 0.92 11/18/2024    BUN 17 11/18/2024    CO2 27 11/18/2024    TSH 4.91 (H) 11/18/2024         Echocardiogram  [facility], [address] [place] [address]               Tel [phone] and Fax [phone]     TRANSTHORACIC ECHOCARDIOGRAM REPORT        Patient Name:     PRESLEY Samuels Physician:   [id number] Corbin Rojas MD  Study Date:       1/7/2019          Referring Physician: Jayde Mccord CNP  MRN/PID:          [id number]          PCP:                 Christen Cheuvront CNP  Accession/Order#: [id number]      Department " Location: [facility]  YOB: 1980         Fellow:  Gender:           M                 Nurse:  Admit Date:                         Sonographer:         Rosemarie Simpson [facility]  Admission Status: Outpatient        Additional Staff:  Height:           177.80 cm         CC Report to:        Jayde Mccord CNP  Weight:           108.86 kg         Study Type:          Echocardiogram  BSA:              2.26 m2  Blood Pressure: 133 /89 mmHg     Diagnosis/ICD: R55-Syncope  Indication:    SOB / CP  Procedure/CPT: Echo Complete w Full Doppler-[id number]     Patient History:  Smoker:            Current.  Pertinent History: Chest Pain, Dyspnea, Syncope and Palpitations. fatigue.     Study Detail: The following Echo studies were performed: 2D, M-Mode, Doppler and                color flow. Technically challenging study due to respiratory                interference. The patient was awake.        PHYSICIAN INTERPRETATION:  Left Ventricle: The left ventricular systolic function is normal, with an estimated ejection fraction of 55-60%. The left ventricular cavity size is normal. Spectral Doppler shows a normal pattern of left ventricular diastolic filling.  LV Wall Scoring:  All segments are normal.     Left Atrium: The left atrium is normal in size.  Right Ventricle: The right ventricle is normal in size. There is normal right ventricular global systolic function.  Right Atrium: The right atrium is normal in size.  Aortic Valve: The aortic valve is trileaflet. There is no evidence of aortic valve regurgitation. The peak instantaneous gradient of the aortic valve is 6.2 mmHg.  Mitral Valve: The mitral valve is normal in structure. There is no evidence of mitral valve regurgitation.  Tricuspid Valve: The tricuspid valve is structurally normal. No evidence of tricuspid regurgitation. The right ventricular systolic pressure is unable to be estimated.  Pulmonic Valve: The pulmonic valve is structurally normal. There is  no indication of pulmonic valve regurgitation.  Pericardium: There is no pericardial effusion noted.  Aorta: The aortic root is normal.        CONCLUSIONS:   1. The left ventricular systolic function is normal with a 55-60% estimated ejection fraction.        Stress Testing: No results found for this or any previous visit from the past 1825 days.     Cardiac Catheterization: No results found for this or any previous visit from the past 1825 days.     Cardiac Scoring  MRN: [id number]  Patient Name: PRESLEY MALDONADO     STUDY:  CT CARDIAC SCORING; 12/24/2018 11:47 am     INDICATION:  Signs/Symptoms: CHEST PAIN, CLARKE, SYNCOPE.     COMPARISON:  None.     ACCESSION NUMBER(S):  [id number]     ORDERING CLINICIAN:  RADHA CADET     TECHNIQUE:  Using prospective ECG gating, CT scan of the coronary arteries was  performed without intravenous contrast. Coronary calcium scoring  was  performed according to the method of Agatston.     FINDINGS:  The score and distribution of calcium in the coronary arteries is as  follows:     LM           0  LAD          0  LCx          0  RCA          0     Total         0     AAA : No results found for this or any previous visit from the past 1825 days.     OTHER: No results found for this or any previous visit from the past 1825 days.        The 10-year ASCVD risk score (Adolfo RICHARDSON, et al., 2019) is: 4.4%    Values used to calculate the score:      Age: 44 years      Sex: Male      Is Non- : No      Diabetic: No      Tobacco smoker: No      Systolic Blood Pressure: 156 mmHg      Is BP treated: Yes      HDL Cholesterol: 48.2 mg/dL      Total Cholesterol: 229 mg/dL     The 10-year ASCVD risk score (Adolfo RICHARDSON, et al., 2019) is: 4.2%    Values used to calculate the score:      Age: 44 years      Sex: Male      Is Non- : No      Diabetic: No      Tobacco smoker: No      Systolic Blood Pressure: 152 mmHg      Is BP treated: Yes      HDL Cholesterol:  48.2 mg/dL      Total Cholesterol: 229 mg/dL       Assessment:   44 y.o. male with a PMH of atypical CP, SERGEY (on CPAP), Canela's esophagus, HTN, hypertriglyceridemia, SERGEY (CPAP), ETOH/TOB, FHx CAD, cluster headaches presents today for cardiac evaluation. CT CAC score 12/2018 was 0. Echo 1/2019 showed LVEF 55-60%. EKG 11/2024 showed SB HR 47 bpm. He is on verapamil for cluster headaches and increases dosing as needed based on symptoms. He takes anywhere from 120 mg daily to 360 mg TID. This was originally prescribed through neurologist at [facility]. He denies cardiac symptoms - angina, dyspnea, lightheadedness, syncope.     1. Routine general medical examination at a health care facility  Referral to Cardiology      2. Primary hypertension  Transthoracic Echo (TTE) Complete      3. Mixed hyperlipidemia        4. Hypertriglyceridemia        5. SERGEY (obstructive sleep apnea)  Transthoracic Echo (TTE) Complete           Plan:  -TTE for structural and functional assessment  -Consider Holter monitor  -Monitor BP at home  -Continue fenofibrate 48 mg daily (began in 11/2024), recheck lipids in approximately 6 months per PCP  -Follow up in 6 months  ____________________________________________________________  Richar Perdomo CNP  Cardiovascular Medicine   [facility] [place]  [facility]    Lab review: I have personally reviewed the laboratory result(s) CBC, CMP, Lipid panel  Diagnostic review: I have personally reviewed the result(s) of the EKG and Echocardiogram   Imaging review: I have  personally reviewed the result(s) CT CAC Score

## 2024-12-19 ENCOUNTER — APPOINTMENT (OUTPATIENT)
Age: 44
End: 2024-12-19
Payer: COMMERCIAL

## 2024-12-23 ENCOUNTER — OFFICE VISIT (OUTPATIENT)
Dept: CARDIOLOGY | Facility: CLINIC | Age: 44
End: 2024-12-23
Payer: COMMERCIAL

## 2024-12-23 VITALS
HEIGHT: 68 IN | DIASTOLIC BLOOD PRESSURE: 93 MMHG | WEIGHT: 265 LBS | SYSTOLIC BLOOD PRESSURE: 152 MMHG | BODY MASS INDEX: 40.16 KG/M2 | OXYGEN SATURATION: 95 % | HEART RATE: 97 BPM

## 2024-12-23 DIAGNOSIS — E78.1 HYPERTRIGLYCERIDEMIA: ICD-10-CM

## 2024-12-23 DIAGNOSIS — G47.33 OSA (OBSTRUCTIVE SLEEP APNEA): ICD-10-CM

## 2024-12-23 DIAGNOSIS — Z00.00 ROUTINE GENERAL MEDICAL EXAMINATION AT A HEALTH CARE FACILITY: Primary | ICD-10-CM

## 2024-12-23 DIAGNOSIS — E78.2 MIXED HYPERLIPIDEMIA: ICD-10-CM

## 2024-12-23 DIAGNOSIS — I10 PRIMARY HYPERTENSION: ICD-10-CM

## 2024-12-23 PROCEDURE — 3080F DIAST BP >= 90 MM HG: CPT | Performed by: NURSE PRACTITIONER

## 2024-12-23 PROCEDURE — 99204 OFFICE O/P NEW MOD 45 MIN: CPT | Performed by: NURSE PRACTITIONER

## 2024-12-23 PROCEDURE — 3008F BODY MASS INDEX DOCD: CPT | Performed by: NURSE PRACTITIONER

## 2024-12-23 PROCEDURE — 3077F SYST BP >= 140 MM HG: CPT | Performed by: NURSE PRACTITIONER

## 2024-12-23 PROCEDURE — 99214 OFFICE O/P EST MOD 30 MIN: CPT | Performed by: NURSE PRACTITIONER

## 2024-12-23 PROCEDURE — 1036F TOBACCO NON-USER: CPT | Performed by: NURSE PRACTITIONER

## 2024-12-23 ASSESSMENT — ENCOUNTER SYMPTOMS
BACK PAIN: 1
SLEEP DISTURBANCE: 1
HEADACHES: 1

## 2025-01-07 ENCOUNTER — APPOINTMENT (OUTPATIENT)
Dept: CARDIOLOGY | Facility: CLINIC | Age: 45
End: 2025-01-07
Payer: COMMERCIAL

## 2025-01-16 ENCOUNTER — TELEPHONE (OUTPATIENT)
Dept: PREADMISSION TESTING | Facility: HOSPITAL | Age: 45
End: 2025-01-16

## 2025-01-16 DIAGNOSIS — Z12.11 COLON CANCER SCREENING: Primary | ICD-10-CM

## 2025-01-20 ENCOUNTER — APPOINTMENT (OUTPATIENT)
Dept: OPERATING ROOM | Facility: HOSPITAL | Age: 45
End: 2025-01-20
Payer: COMMERCIAL

## 2025-03-22 DIAGNOSIS — E78.2 MIXED HYPERLIPIDEMIA: ICD-10-CM

## 2025-03-24 RX ORDER — FENOFIBRATE 48 MG/1
48 TABLET, FILM COATED ORAL DAILY
Qty: 90 TABLET | Refills: 1 | Status: SHIPPED | OUTPATIENT
Start: 2025-03-24 | End: 2025-09-20

## 2025-06-04 NOTE — PROGRESS NOTES
Primary Care Physician: Sherif Oconnell DO   Date of Visit: 06/23/2025  9:00 AM EDT  Type of Visit: Follow up      Chief Complaint:  No chief complaint on file.       HPI  Jack Medina 45 y.o. male with a PMH of atypical CP, SERGEY (on CPAP), Canela's esophagus, HTN, hypertriglyceridemia, SERGEY (CPAP), ETOH/TOB, FHx CAD, cluster headaches presents today for follow up. CT CAC score 12/2018 was 0. Echo 1/2019 showed LVEF 55-60%. EKG 11/2024 showed SB HR 47 bpm. Lipids 11/2024 total 229, HDL 48, , triglycerides 254. Cardiac medications include fenofibrate 48 mg daily and verapamil 240 mg nightly. He is on verapamil for cluster headaches and increases dosing as needed based on symptoms. He takes anywhere from 120 mg daily to 360 mg TID. This was originally prescribed through neurologist at [facility].      Father MI with stents with CHF, mom MI stents.    He was seen 12/2024. TTE was ordered, however not completed.       Review of Systems   Review of Systems   12 points review of systems are negative expect for the above    Social History:  Social History     Socioeconomic History    Marital status:      Spouse name: Not on file    Number of children: Not on file    Years of education: Not on file    Highest education level: Not on file   Occupational History    Not on file   Tobacco Use    Smoking status: Former     Current packs/day: 0.00     Types: Cigarettes     Passive exposure: Past    Smokeless tobacco: Never   Vaping Use    Vaping status: Every Day    Substances: Nicotine, Flavoring    Devices: Disposable   Substance and Sexual Activity    Alcohol use: Yes     Comment: 4 drinks a day    Drug use: Never    Sexual activity: Yes     Partners: Female   Other Topics Concern    Not on file   Social History Narrative    Not on file     Social Drivers of Health     Financial Resource Strain: Low Risk  (9/12/2022)    Received from [facility]    Overall Financial Resource Strain (CARDIA)      Difficulty of Paying Living Expenses: Not hard at all   Food Insecurity: No Food Insecurity (9/12/2022)    Received from [facility]    Hunger Vital Sign     Worried About Running Out of Food in the Last Year: Never true     Ran Out of Food in the Last Year: Never true   Transportation Needs: No Transportation Needs (9/12/2022)    Received from [facility]    PRAPARE - Transportation     Lack of Transportation (Medical): No     Lack of Transportation (Non-Medical): No   Physical Activity: Insufficiently Active (9/12/2022)    Received from [facility]    Exercise Vital Sign     Days of Exercise per Week: 1 day     Minutes of Exercise per Session: 10 min   Stress: Stress Concern Present (9/12/2022)    Received from [facility]    [employer] [place] [employer] - Occupational Stress Questionnaire     Feeling of Stress : To some extent   Social Connections: Moderately Isolated (9/12/2022)    Received from [facility]    Social Connection and Isolation Panel [NHANES]     Frequency of Communication with Friends and Family: More than three times a week     Frequency of Social Gatherings with Friends and Family: Once a week     Attends Yazidism Services: Never     Active Member of Clubs or Organizations: No     Attends Club or Organization Meetings: Not on file     Marital Status: Living with partner   Intimate Partner Violence: Not At Risk (9/12/2022)    Received from [facility]    Humiliation, Afraid, Rape, and Kick questionnaire     Fear of Current or Ex-Partner: No     Emotionally Abused: No     Physically Abused: No     Sexually Abused: No   Housing Stability: Low Risk  (9/12/2022)    Received from [facility]    Housing Stability Vital Sign     Unable to Pay for Housing in the Last Year: No     Number of Places Lived in the Last Year: 1     Unstable Housing in the Last Year: No        Past Medical History:  Medical History[1]    Past Surgical History:  Surgical History[2]    Family History:  Family  "History[3]     Objective:   Physical Exam        5/27/2022    12:00 AM 6/1/2022    12:00 AM 7/14/2022    12:00 AM 4/25/2024     9:19 AM 11/18/2024     7:21 AM 11/18/2024     9:25 AM 12/23/2024     9:06 AM   Vitals   Systolic 131 156 138 162 128 156 152   Diastolic 90 100 80 111 82 80 93   BP Location    Left arm   Left arm   Heart Rate 92 90 78 70 87 60 97   Temp 36.7 °C (98 °F) 37.3 °C (99.1 °F) 36.5 °C (97.7 °F)  36.6 °C (97.8 °F) 36.6 °C (97.8 °F)    Resp 18 18 19 20     Height 1.778 m (5' 10\") 1.778 m (5' 10\") 1.778 m (5' 10\") 1.778 m (5' 10\") 1.778 m (5' 10\") 1.753 m (5' 9\") 1.727 m (5' 8\")   Weight (lb) 258 256.6 249 266 265.2 265 265   BMI 37.02 kg/m2 36.82 kg/m2 35.73 kg/m2 38.17 kg/m2 38.05 kg/m2 39.13 kg/m2 40.29 kg/m2   BSA (m2) 2.4 m2 2.39 m2 2.36 m2 2.44 m2 2.43 m2 2.42 m2 2.4 m2   Visit Report    Report Report    Report Report    Report Report        Allergies:  Allergies[4]    Medications:  Current Outpatient Medications   Medication Instructions    fenofibrate (TRICOR) 48 mg, oral, Daily    galcanezumab (Emgality Syringe) 300 mg/3 mL (100 mg/mL x 3) prefilled syringe Inject under the skin.    omeprazole (PRILOSEC) 40 mg, Daily before breakfast    SUMAtriptan (IMITREX) 6 mg, As needed    SUMAtriptan (IMITREX) 100 mg, oral, Once as needed    verapamil (CALAN) 120 mg, 2 times daily    verapamil SR (CALAN-SR) 240 mg, Nightly        Labs and Imaging:     Lab Results   Component Value Date    WBC 6.3 11/18/2024    HGB 15.0 11/18/2024    HCT 45.2 11/18/2024     11/18/2024    CHOL 229 (H) 11/18/2024    TRIG 254 (H) 11/18/2024    HDL 48.2 11/18/2024    ALT 45 11/18/2024    AST 30 11/18/2024     11/18/2024    K 4.3 11/18/2024     11/18/2024    CREATININE 0.92 11/18/2024    BUN 17 11/18/2024    CO2 27 11/18/2024    TSH 4.91 (H) 11/18/2024         Echocardiogram  1/7/2019  CONCLUSIONS:   1. The left ventricular systolic function is normal with a 55-60% estimated ejection fraction.    Stress " Testing: No results found for this or any previous visit from the past 1825 days.    Cardiac Catheterization: No results found for this or any previous visit from the past 1825 days.    Cardiac Scoring  MRN: [id number]  Patient Name: PRESLEY MALDONADO     STUDY:  CT CARDIAC SCORING; 12/24/2018 11:47 am     INDICATION:  Signs/Symptoms: CHEST PAIN, CLARKE, SYNCOPE.     COMPARISON:  None.     ACCESSION NUMBER(S):  [id number]     ORDERING CLINICIAN:  RADHA CADET     TECHNIQUE:  Using prospective ECG gating, CT scan of the coronary arteries was  performed without intravenous contrast. Coronary calcium scoring  was  performed according to the method of Agatston.     FINDINGS:  The score and distribution of calcium in the coronary arteries is as  follows:     LM           0  LAD          0  LCx          0  RCA          0     Total         0       AAA : No results found for this or any previous visit from the past 1825 days.    OTHER: No results found for this or any previous visit from the past 1825 days.      The 10-year ASCVD risk score (Adolfo RICHARDSON, et al., 2019) is: 4.7%    Values used to calculate the score:      Age: 45 years      Sex: Male      Is Non- : No      Diabetic: No      Tobacco smoker: No      Systolic Blood Pressure: 152 mmHg      Is BP treated: Yes      HDL Cholesterol: 48.2 mg/dL      Total Cholesterol: 229 mg/dL         Assessment:   ***    No diagnosis found.     Plan:      ____________________________________________________________  Richar Perdomo CNP  Cardiovascular Medicine   [facility] [place]  [facility]    {Lab/Diag/Rad Review:44515}       [1]   Past Medical History:  Diagnosis Date    Melena 01/11/2019    Blood in stool    Other specified health status     No pertinent past medical history    Personal history of other diseases of the digestive system 01/11/2019    History of hematemesis   [2]   Past Surgical History:  Procedure Laterality  Date    MULTIPLE TOOTH EXTRACTIONS Bilateral 01/2023    all teeth removed-implants placed   [3]   Family History  Problem Relation Name Age of Onset    Heart disease Mother      Autoimmune disease Mother      Heart attack Father     [4] No Known Allergies

## 2025-06-23 ENCOUNTER — APPOINTMENT (OUTPATIENT)
Dept: CARDIOLOGY | Facility: CLINIC | Age: 45
End: 2025-06-23
Payer: COMMERCIAL

## 2025-08-07 ENCOUNTER — APPOINTMENT (OUTPATIENT)
Dept: PRIMARY CARE | Facility: CLINIC | Age: 45
End: 2025-08-07
Payer: COMMERCIAL

## 2025-08-12 ENCOUNTER — OFFICE VISIT (OUTPATIENT)
Dept: PRIMARY CARE | Facility: CLINIC | Age: 45
End: 2025-08-12
Payer: COMMERCIAL

## 2025-08-12 VITALS
SYSTOLIC BLOOD PRESSURE: 140 MMHG | WEIGHT: 267 LBS | HEART RATE: 87 BPM | OXYGEN SATURATION: 98 % | DIASTOLIC BLOOD PRESSURE: 90 MMHG | BODY MASS INDEX: 40.6 KG/M2

## 2025-08-12 DIAGNOSIS — Z00.00 ROUTINE MEDICAL EXAM: ICD-10-CM

## 2025-08-12 DIAGNOSIS — E78.00 PURE HYPERCHOLESTEROLEMIA: Primary | ICD-10-CM

## 2025-08-12 DIAGNOSIS — Z83.3 FAMILY HISTORY OF DIABETES MELLITUS: ICD-10-CM

## 2025-08-12 DIAGNOSIS — E78.5 HYPERLIPIDEMIA, UNSPECIFIED HYPERLIPIDEMIA TYPE: ICD-10-CM

## 2025-08-12 DIAGNOSIS — I10 PRIMARY HYPERTENSION: ICD-10-CM

## 2025-08-12 DIAGNOSIS — S50.02XA CONTUSION OF LEFT ELBOW, INITIAL ENCOUNTER: ICD-10-CM

## 2025-08-12 PROCEDURE — 99396 PREV VISIT EST AGE 40-64: CPT | Performed by: NURSE PRACTITIONER

## 2025-08-12 PROCEDURE — 3080F DIAST BP >= 90 MM HG: CPT | Performed by: NURSE PRACTITIONER

## 2025-08-12 PROCEDURE — 3077F SYST BP >= 140 MM HG: CPT | Performed by: NURSE PRACTITIONER

## 2025-08-12 ASSESSMENT — PAIN SCALES - GENERAL: PAINLEVEL_OUTOF10: 0-NO PAIN

## 2025-08-12 ASSESSMENT — ENCOUNTER SYMPTOMS
OCCASIONAL FEELINGS OF UNSTEADINESS: 0
LOSS OF SENSATION IN FEET: 0
DEPRESSION: 0

## 2025-08-14 ASSESSMENT — ENCOUNTER SYMPTOMS
NEUROLOGICAL NEGATIVE: 1
CONSTITUTIONAL NEGATIVE: 1
GASTROINTESTINAL NEGATIVE: 1
ARTHRALGIAS: 1
RESPIRATORY NEGATIVE: 1
CARDIOVASCULAR NEGATIVE: 1

## 2025-08-18 ENCOUNTER — APPOINTMENT (OUTPATIENT)
Dept: ORTHOPEDIC SURGERY | Facility: CLINIC | Age: 45
End: 2025-08-18
Payer: COMMERCIAL

## 2025-08-22 ENCOUNTER — APPOINTMENT (OUTPATIENT)
Facility: CLINIC | Age: 45
End: 2025-08-22
Payer: COMMERCIAL

## 2025-08-22 ENCOUNTER — APPOINTMENT (OUTPATIENT)
Dept: ORTHOPEDIC SURGERY | Facility: CLINIC | Age: 45
End: 2025-08-22
Payer: COMMERCIAL

## 2025-08-22 VITALS — BODY MASS INDEX: 39.24 KG/M2 | HEIGHT: 67 IN | WEIGHT: 250 LBS

## 2025-08-22 DIAGNOSIS — M77.12 LEFT LATERAL EPICONDYLITIS: Primary | ICD-10-CM

## 2025-08-22 DIAGNOSIS — M25.522 LEFT ELBOW PAIN: ICD-10-CM

## 2025-08-22 PROCEDURE — 20605 DRAIN/INJ JOINT/BURSA W/O US: CPT | Performed by: ORTHOPAEDIC SURGERY

## 2025-08-22 PROCEDURE — 99204 OFFICE O/P NEW MOD 45 MIN: CPT | Performed by: ORTHOPAEDIC SURGERY

## 2025-08-22 PROCEDURE — 3008F BODY MASS INDEX DOCD: CPT | Performed by: ORTHOPAEDIC SURGERY

## 2025-08-22 PROCEDURE — L3908 WHO COCK-UP NONMOLDE PRE OTS: HCPCS | Performed by: ORTHOPAEDIC SURGERY

## 2025-08-22 RX ORDER — LIDOCAINE HYDROCHLORIDE 10 MG/ML
3 INJECTION, SOLUTION INFILTRATION; PERINEURAL
Status: COMPLETED | OUTPATIENT
Start: 2025-08-22 | End: 2025-08-22

## 2025-08-22 RX ORDER — TRIAMCINOLONE ACETONIDE 10 MG/ML
40 INJECTION, SUSPENSION INTRA-ARTICULAR; INTRALESIONAL
Status: COMPLETED | OUTPATIENT
Start: 2025-08-22 | End: 2025-08-22

## 2025-08-22 RX ORDER — NAPROXEN 500 MG/1
500 TABLET ORAL 2 TIMES DAILY PRN
Qty: 60 TABLET | Refills: 0 | Status: SHIPPED | OUTPATIENT
Start: 2025-08-22 | End: 2025-09-21

## 2025-08-22 RX ADMIN — LIDOCAINE HYDROCHLORIDE 3 ML: 10 INJECTION, SOLUTION INFILTRATION; PERINEURAL at 15:18

## 2025-08-22 RX ADMIN — TRIAMCINOLONE ACETONIDE 40 MG: 10 INJECTION, SUSPENSION INTRA-ARTICULAR; INTRALESIONAL at 15:18

## 2025-08-22 ASSESSMENT — PAIN SCALES - GENERAL: PAINLEVEL_OUTOF10: 4

## 2025-08-22 ASSESSMENT — PAIN - FUNCTIONAL ASSESSMENT: PAIN_FUNCTIONAL_ASSESSMENT: 0-10

## 2025-08-26 RX ORDER — DIVALPROEX SODIUM 500 MG/1
1 TABLET, FILM COATED, EXTENDED RELEASE ORAL
COMMUNITY
Start: 2024-10-26

## 2025-08-26 RX ORDER — PANTOPRAZOLE SODIUM 40 MG/1
1 TABLET, DELAYED RELEASE ORAL
COMMUNITY
Start: 2024-10-04

## 2025-08-27 ENCOUNTER — APPOINTMENT (OUTPATIENT)
Dept: GASTROENTEROLOGY | Facility: CLINIC | Age: 45
End: 2025-08-27
Payer: COMMERCIAL

## 2025-08-27 VITALS — BODY MASS INDEX: 37.62 KG/M2 | WEIGHT: 254 LBS | HEIGHT: 69 IN

## 2025-08-27 DIAGNOSIS — K59.09 CHRONIC CONSTIPATION: ICD-10-CM

## 2025-08-27 DIAGNOSIS — R13.19 ESOPHAGEAL DYSPHAGIA: ICD-10-CM

## 2025-08-27 DIAGNOSIS — K22.70 BARRETT'S ESOPHAGUS WITHOUT DYSPLASIA: ICD-10-CM

## 2025-08-27 DIAGNOSIS — K62.5 RECTAL BLEEDING: Primary | ICD-10-CM

## 2025-08-27 PROCEDURE — 1036F TOBACCO NON-USER: CPT | Performed by: INTERNAL MEDICINE

## 2025-08-27 PROCEDURE — 99204 OFFICE O/P NEW MOD 45 MIN: CPT | Performed by: INTERNAL MEDICINE

## 2025-08-27 PROCEDURE — 3008F BODY MASS INDEX DOCD: CPT | Performed by: INTERNAL MEDICINE

## 2025-08-27 RX ORDER — POLYETHYLENE GLYCOL-3350 AND ELECTROLYTES WITH FLAVOR PACK 240; 5.84; 2.98; 6.72; 22.72 G/278.26G; G/278.26G; G/278.26G; G/278.26G; G/278.26G
4000 POWDER, FOR SOLUTION ORAL ONCE
Qty: 4000 ML | Refills: 0 | Status: SHIPPED | OUTPATIENT
Start: 2025-08-27 | End: 2025-08-27

## 2025-08-29 ENCOUNTER — APPOINTMENT (OUTPATIENT)
Dept: GASTROENTEROLOGY | Facility: EXTERNAL LOCATION | Age: 45
End: 2025-08-29
Payer: COMMERCIAL

## 2025-09-02 DIAGNOSIS — Z12.11 SCREENING FOR MALIGNANT NEOPLASM OF COLON: Primary | ICD-10-CM

## 2025-09-02 RX ORDER — SOD SULF/POT CHLORIDE/MAG SULF 1.479 G
12 TABLET ORAL EVERY 12 HOURS
Qty: 24 TABLET | Refills: 0 | Status: SHIPPED | OUTPATIENT
Start: 2025-09-02